# Patient Record
Sex: FEMALE | Race: WHITE | NOT HISPANIC OR LATINO | Employment: OTHER | URBAN - METROPOLITAN AREA
[De-identification: names, ages, dates, MRNs, and addresses within clinical notes are randomized per-mention and may not be internally consistent; named-entity substitution may affect disease eponyms.]

---

## 2024-01-17 PROBLEM — R06.09 EXERTIONAL DYSPNEA: Status: ACTIVE | Noted: 2024-01-17

## 2024-01-17 PROBLEM — E78.49 OTHER HYPERLIPIDEMIA: Status: ACTIVE | Noted: 2024-01-17

## 2024-01-17 PROBLEM — I35.1 MODERATE AORTIC INSUFFICIENCY: Status: ACTIVE | Noted: 2024-01-17

## 2024-01-17 PROBLEM — I25.10 CORONARY ARTERY DISEASE: Status: ACTIVE | Noted: 2024-01-17

## 2024-01-17 PROBLEM — R53.83 FATIGUE: Status: ACTIVE | Noted: 2024-01-17

## 2024-01-17 PROBLEM — E78.5 HYPERLIPIDEMIA: Status: ACTIVE | Noted: 2024-01-17

## 2024-01-17 PROBLEM — I10 BENIGN ESSENTIAL HTN: Status: ACTIVE | Noted: 2024-01-17

## 2024-01-24 ENCOUNTER — OFFICE VISIT (OUTPATIENT)
Dept: CARDIOLOGY | Facility: CLINIC | Age: 84
End: 2024-01-24
Payer: MEDICARE

## 2024-01-24 VITALS
DIASTOLIC BLOOD PRESSURE: 70 MMHG | OXYGEN SATURATION: 96 % | HEIGHT: 62 IN | WEIGHT: 105 LBS | SYSTOLIC BLOOD PRESSURE: 118 MMHG | BODY MASS INDEX: 19.32 KG/M2 | HEART RATE: 89 BPM

## 2024-01-24 DIAGNOSIS — I10 BENIGN ESSENTIAL HTN: ICD-10-CM

## 2024-01-24 DIAGNOSIS — E78.49 OTHER HYPERLIPIDEMIA: ICD-10-CM

## 2024-01-24 DIAGNOSIS — R06.09 EXERTIONAL DYSPNEA: ICD-10-CM

## 2024-01-24 DIAGNOSIS — I35.0 NONRHEUMATIC AORTIC VALVE STENOSIS: Primary | ICD-10-CM

## 2024-01-24 DIAGNOSIS — I25.10 CORONARY ARTERY DISEASE INVOLVING NATIVE CORONARY ARTERY OF NATIVE HEART WITHOUT ANGINA PECTORIS: ICD-10-CM

## 2024-01-24 DIAGNOSIS — R53.82 CHRONIC FATIGUE: ICD-10-CM

## 2024-01-24 PROBLEM — I35.1 MODERATE AORTIC INSUFFICIENCY: Status: RESOLVED | Noted: 2024-01-17 | Resolved: 2024-01-24

## 2024-01-24 PROCEDURE — 1159F MED LIST DOCD IN RCRD: CPT | Performed by: INTERNAL MEDICINE

## 2024-01-24 PROCEDURE — 1036F TOBACCO NON-USER: CPT | Performed by: INTERNAL MEDICINE

## 2024-01-24 PROCEDURE — 99214 OFFICE O/P EST MOD 30 MIN: CPT | Performed by: INTERNAL MEDICINE

## 2024-01-24 PROCEDURE — 3078F DIAST BP <80 MM HG: CPT | Performed by: INTERNAL MEDICINE

## 2024-01-24 PROCEDURE — 3074F SYST BP LT 130 MM HG: CPT | Performed by: INTERNAL MEDICINE

## 2024-01-24 PROCEDURE — 1126F AMNT PAIN NOTED NONE PRSNT: CPT | Performed by: INTERNAL MEDICINE

## 2024-01-24 RX ORDER — ELECTROLYTES/DEXTROSE
SOLUTION, ORAL ORAL
COMMUNITY
Start: 2019-11-05

## 2024-01-24 RX ORDER — CHOLECALCIFEROL (VITAMIN D3) 50 MCG
TABLET ORAL
COMMUNITY
Start: 2010-05-06

## 2024-01-24 RX ORDER — HYDROCHLOROTHIAZIDE 12.5 MG/1
12.5 TABLET ORAL DAILY
COMMUNITY
End: 2024-03-07 | Stop reason: ALTCHOICE

## 2024-01-24 RX ORDER — AMLODIPINE BESYLATE 5 MG/1
5 TABLET ORAL 2 TIMES DAILY
COMMUNITY

## 2024-01-24 RX ORDER — ROSUVASTATIN CALCIUM 40 MG/1
40 TABLET, COATED ORAL DAILY
COMMUNITY
End: 2024-04-10 | Stop reason: SDUPTHER

## 2024-01-24 RX ORDER — LOSARTAN POTASSIUM 100 MG/1
100 TABLET ORAL DAILY
COMMUNITY
End: 2024-03-07 | Stop reason: ALTCHOICE

## 2024-01-24 RX ORDER — ALENDRONATE SODIUM 70 MG/1
TABLET ORAL
COMMUNITY

## 2024-01-24 RX ORDER — PSYLLIUM HUSK/CALCIUM CARB 1 G-60 MG
CAPSULE ORAL
COMMUNITY
Start: 2019-11-05

## 2024-01-24 RX ORDER — LANOLIN ALCOHOL/MO/W.PET/CERES
CREAM (GRAM) TOPICAL
COMMUNITY

## 2024-01-24 RX ORDER — FORMOTEROL FUMARATE DIHYDRATE 20 UG/2ML
SOLUTION RESPIRATORY (INHALATION)
COMMUNITY

## 2024-01-24 RX ORDER — ATORVASTATIN CALCIUM 40 MG/1
40 TABLET, FILM COATED ORAL
COMMUNITY
End: 2024-01-24 | Stop reason: WASHOUT

## 2024-01-24 RX ORDER — LEFLUNOMIDE 10 MG/1
10 TABLET ORAL DAILY
COMMUNITY

## 2024-01-24 RX ORDER — ASCORBIC ACID 500 MG
1 TABLET ORAL DAILY
COMMUNITY
Start: 2022-05-17

## 2024-01-24 NOTE — PROGRESS NOTES
"Subjective   Raven Lujan is a 83 y.o. female.    Chief Complaint:  Follow-up coronary artery disease and hypertension.    HPI    From a cardiac standpoint she is feeling well.  She has some arthritis issues.  She did have COVID in the past but has had no symptoms of long COVID.  Has not had any typical anginal symptoms.  Mild exertional dyspnea.  She has noted highly variable blood pressures.  At her physician's office the other day her blood pressure was 198/112.  Over a period of about 10 minutes it dropped to 150/70.  Does not routinely check her blood pressures at home although she is starting to do so.  No other medical problems or medical illnesses since her last visit.    She has a positive calcium score of 816 with calcification located in the proximal left anterior descending coronary artery and is consistent with the presence of extensive plaque formation.     Other medical problems include a history of chronic obstructive lung disease or asthma, although she has never smoked. She has a history of hyperlipidemia and a strongly positive family history of heart disease. 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    Allergies to medications: None known    Family history: Family history of coronary artery disease involving multiple family members.    Social history: She is .  She is a non-smoker and has never smoked.    ROS    Visit Vitals  /70 (BP Location: Left arm, Patient Position: Sitting, BP Cuff Size: Adult)   Pulse 89   Ht 1.575 m (5' 2\")   Wt 47.6 kg (105 lb)   SpO2 96%   BMI 19.20 kg/m²   Smoking Status Former   BSA 1.44 m²        Objective     Constitutional:       Appearance: Not in distress.   Neck:      Vascular: JVD normal.   Pulmonary:      Breath sounds: Normal breath sounds.   Cardiovascular:      Normal rate. Regular rhythm. Normal S1. Normal S2.       Murmurs: There is a grade 1/6 systolic murmur.      No gallop.    Pulses:     Intact distal pulses. "   Edema:     Peripheral edema absent.   Abdominal:      General: There is no distension.      Palpations: Abdomen is soft.   Neurological:      Mental Status: Alert.       Assessment:    1.  Coronary disease.  Extensive coronary artery disease based on a prior CT calcium score.  This testing was done several years ago.  She had a nuclear stress thallium study in 2021 which showed no evidence of ischemic heart disease with the left ventricular ejection fraction of 74%.    2.  Aortic stenosis and regurgitation.  Mild by her most recent echo study.    3.  Hyperlipidemia.  Cholesterol 135, HDL 44, LDL 69.    4.  Hypertension.  Blood pressures are actually very low today.  We are going to have her track her blood pressures at home.  If she has consistently elevated blood pressures particularly in the morning we would have her do an a.m. cortisol level, catecholamine level and plasma metanephrine level.

## 2024-01-24 NOTE — PATIENT INSTRUCTIONS
If your blood pressure is consistently elevated above 150 systolic, call us and we will arrange for you to have some blood tests.

## 2024-02-12 ENCOUNTER — TELEPHONE (OUTPATIENT)
Dept: CARDIOLOGY | Facility: CLINIC | Age: 84
End: 2024-02-12
Payer: MEDICARE

## 2024-02-12 DIAGNOSIS — I10 BENIGN ESSENTIAL HTN: Primary | ICD-10-CM

## 2024-02-12 NOTE — TELEPHONE ENCOUNTER
Pt states Dr. Saenz wanted her to call the office if her blood pressure was consistently >150.    Home BP's: 150's-100's/80's-112.    Meds: Losartan 100mg in the a.m., hydrochlorothiazide 12.5mg in the a.m., Amlodipine 5mg twice a day.    Dr. Saenz was going to order labs if BP's remained elevated.    Cortisol in a.m., catecholamine level and plasma metanephrine level??    Please advise. Thanks.

## 2024-02-13 NOTE — TELEPHONE ENCOUNTER
Cortisol level catecholamines and plasma metanephrines.    Also nurse visit.  Have her bring in blood pressure machine and check with your manual blood pressure

## 2024-02-13 NOTE — TELEPHONE ENCOUNTER
Called and notified pt of plan for labs and nurse visit. Pt agreeable. Labs entered and pt informed to have done early morning because that is necessary for the cortisol level that Dr. Saenz is ordering. Pt scheduled for nurse visit, BP check on 2/22/24 and aware to bring home BP cuff to compare results. Pt verbalizes understanding of all instructions and information. All questions and concerns addressed at this time.

## 2024-02-19 ENCOUNTER — LAB (OUTPATIENT)
Dept: LAB | Facility: LAB | Age: 84
End: 2024-02-19
Payer: MEDICARE

## 2024-02-19 DIAGNOSIS — I10 BENIGN ESSENTIAL HTN: ICD-10-CM

## 2024-02-19 LAB — CORTIS AM PEAK SERPL-MSCNC: 31.1 UG/DL (ref 5–20)

## 2024-02-19 PROCEDURE — 83835 ASSAY OF METANEPHRINES: CPT

## 2024-02-19 PROCEDURE — 82533 TOTAL CORTISOL: CPT

## 2024-02-19 PROCEDURE — 36415 COLL VENOUS BLD VENIPUNCTURE: CPT

## 2024-02-19 PROCEDURE — 82384 ASSAY THREE CATECHOLAMINES: CPT

## 2024-02-22 ENCOUNTER — APPOINTMENT (OUTPATIENT)
Dept: CARDIOLOGY | Facility: CLINIC | Age: 84
End: 2024-02-22
Payer: MEDICARE

## 2024-02-22 ENCOUNTER — TELEPHONE (OUTPATIENT)
Dept: CARDIOLOGY | Facility: CLINIC | Age: 84
End: 2024-02-22

## 2024-02-22 NOTE — TELEPHONE ENCOUNTER
----- Message from Ricky Saenz MD sent at 2/20/2024  8:19 AM EST -----  Schedule Raven Lujan for OV 2 weeks

## 2024-02-23 LAB
ANNOTATION COMMENT IMP: ABNORMAL
METANEPHS SERPL-SCNC: 0.64 NMOL/L (ref 0–0.49)
NORMETANEPH FREE SERPL-SCNC: 1.82 NMOL/L (ref 0–0.89)

## 2024-02-26 LAB
DOPAMINE SERPL-MCNC: 67 PG/ML (ref 0–48)
EPINEPH PLAS-MCNC: 82 PG/ML (ref 0–62)
NOREPINEPH PLAS-MCNC: 1001 PG/ML (ref 0–874)

## 2024-02-28 ENCOUNTER — TELEPHONE (OUTPATIENT)
Dept: CARDIOLOGY | Facility: CLINIC | Age: 84
End: 2024-02-28
Payer: MEDICARE

## 2024-02-28 ENCOUNTER — LAB (OUTPATIENT)
Dept: LAB | Facility: LAB | Age: 84
End: 2024-02-28
Payer: MEDICARE

## 2024-02-28 DIAGNOSIS — R53.82 CHRONIC FATIGUE: ICD-10-CM

## 2024-02-28 PROCEDURE — 36415 COLL VENOUS BLD VENIPUNCTURE: CPT

## 2024-02-28 PROCEDURE — 82024 ASSAY OF ACTH: CPT

## 2024-02-28 NOTE — TELEPHONE ENCOUNTER
----- Message from Jody Capone RN sent at 2/27/2024 10:23 AM EST -----  Regarding: results  Per Dr. Saenz, please call pt with results.    ----- Message -----  From: Ricky Saenz MD  Sent: 2/26/2024   8:23 AM EST  To: Ange Mi RT; Alem Lima CMA; #    Needs to have ACTH done. Diagnosis Cushing's syndrome.

## 2024-03-01 LAB — ACTH PLAS-MCNC: 24.2 PG/ML (ref 7.2–63.3)

## 2024-03-04 PROBLEM — R05.9 COUGH: Status: ACTIVE | Noted: 2023-08-22

## 2024-03-04 PROBLEM — J44.1 ACUTE EXACERBATION OF CHRONIC OBSTRUCTIVE PULMONARY DISEASE (MULTI): Status: ACTIVE | Noted: 2024-03-04

## 2024-03-04 PROBLEM — M53.3 SACROILIAC JOINT PAIN: Status: ACTIVE | Noted: 2024-03-04

## 2024-03-04 PROBLEM — Z97.4 USES HEARING AID: Status: ACTIVE | Noted: 2024-03-04

## 2024-03-04 PROBLEM — M35.3 POLYMYALGIA RHEUMATICA (MULTI): Status: ACTIVE | Noted: 2024-03-04

## 2024-03-04 PROBLEM — J40 BRONCHITIS: Status: ACTIVE | Noted: 2024-03-04

## 2024-03-04 PROBLEM — M05.9 SEROPOSITIVE RHEUMATOID ARTHRITIS (MULTI): Status: ACTIVE | Noted: 2024-03-04

## 2024-03-04 PROBLEM — I10 BENIGN ESSENTIAL HTN: Status: RESOLVED | Noted: 2024-01-17 | Resolved: 2024-03-04

## 2024-03-04 PROBLEM — M19.049 LOCALIZED, PRIMARY OSTEOARTHRITIS OF HAND: Status: ACTIVE | Noted: 2024-03-04

## 2024-03-04 PROBLEM — M19.90 ARTHRITIS: Status: ACTIVE | Noted: 2024-03-04

## 2024-03-04 PROBLEM — M25.60 JOINT STIFFNESS: Status: ACTIVE | Noted: 2024-03-04

## 2024-03-04 PROBLEM — E55.9 VITAMIN D DEFICIENCY: Status: ACTIVE | Noted: 2024-03-04

## 2024-03-04 PROBLEM — K21.9 GERD (GASTROESOPHAGEAL REFLUX DISEASE): Status: ACTIVE | Noted: 2024-03-04

## 2024-03-04 PROBLEM — M47.27 LUMBOSACRAL SPONDYLOSIS WITH RADICULOPATHY: Status: ACTIVE | Noted: 2024-03-04

## 2024-03-04 PROBLEM — M54.9 BACKACHE: Status: ACTIVE | Noted: 2024-03-04

## 2024-03-07 ENCOUNTER — OFFICE VISIT (OUTPATIENT)
Dept: CARDIOLOGY | Facility: CLINIC | Age: 84
End: 2024-03-07
Payer: MEDICARE

## 2024-03-07 VITALS
HEART RATE: 93 BPM | HEIGHT: 62 IN | BODY MASS INDEX: 19.51 KG/M2 | SYSTOLIC BLOOD PRESSURE: 120 MMHG | OXYGEN SATURATION: 99 % | DIASTOLIC BLOOD PRESSURE: 80 MMHG | WEIGHT: 106 LBS

## 2024-03-07 DIAGNOSIS — I10 PRIMARY HYPERTENSION: Primary | ICD-10-CM

## 2024-03-07 PROCEDURE — 99213 OFFICE O/P EST LOW 20 MIN: CPT | Performed by: INTERNAL MEDICINE

## 2024-03-07 PROCEDURE — 3079F DIAST BP 80-89 MM HG: CPT | Performed by: INTERNAL MEDICINE

## 2024-03-07 PROCEDURE — 1036F TOBACCO NON-USER: CPT | Performed by: INTERNAL MEDICINE

## 2024-03-07 PROCEDURE — 1126F AMNT PAIN NOTED NONE PRSNT: CPT | Performed by: INTERNAL MEDICINE

## 2024-03-07 PROCEDURE — 1159F MED LIST DOCD IN RCRD: CPT | Performed by: INTERNAL MEDICINE

## 2024-03-07 PROCEDURE — 3074F SYST BP LT 130 MM HG: CPT | Performed by: INTERNAL MEDICINE

## 2024-03-07 RX ORDER — PREDNISONE 20 MG/1
TABLET ORAL
COMMUNITY
Start: 2024-03-06

## 2024-03-07 RX ORDER — LOSARTAN POTASSIUM AND HYDROCHLOROTHIAZIDE 12.5; 1 MG/1; MG/1
1 TABLET ORAL DAILY
Qty: 90 TABLET | Refills: 3 | Status: SHIPPED | OUTPATIENT
Start: 2024-03-07 | End: 2025-03-07

## 2024-03-07 RX ORDER — AZITHROMYCIN 250 MG/1
TABLET, FILM COATED ORAL
COMMUNITY
Start: 2023-07-31

## 2024-03-07 RX ORDER — BUDESONIDE 0.5 MG/2ML
INHALANT ORAL
COMMUNITY
Start: 2024-03-06

## 2024-03-07 ASSESSMENT — ENCOUNTER SYMPTOMS: DYSPNEA ON EXERTION: 1

## 2024-03-07 NOTE — PROGRESS NOTES
Subjective   Raven Lujan is a 83 y.o. female.    Chief Complaint:  Follow-up testing for uncontrolled hypertension.    HPI    Since her last visit she has felt better.  She has improved energy.  She continues to no and record variable blood pressures.  However the swings in blood pressure are much less.  To go as high as 150s systolic and as low as 100 systolic.  Overall however she feels better.  No anginal symptoms.    She has a positive calcium score of 816 with calcification located in the proximal left anterior descending coronary artery and is consistent with the presence of extensive plaque formation.     Other medical problems include a history of chronic obstructive lung disease or asthma, although she has never smoked. She has a history of hyperlipidemia and a strongly positive family history of heart disease. 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     Allergies to medications: None known     Family history: Family history of coronary artery disease involving multiple family members.     Social history: She is .  She is a non-smoker and has never smoked.    Review of Systems   Cardiovascular:  Positive for dyspnea on exertion.   Musculoskeletal:  Positive for arthritis.   All other systems reviewed and are negative.      Visit Vitals  Smoking Status Former        Objective     Constitutional:       Appearance: Not in distress.   Neck:      Vascular: JVD normal.   Pulmonary:      Breath sounds: Normal breath sounds.   Cardiovascular:      Normal rate. Regular rhythm. Normal S1. Normal S2.       Murmurs: There is a grade 1/6 systolic murmur.      No gallop.    Pulses:     Intact distal pulses.   Edema:     Peripheral edema absent.   Abdominal:      General: There is no distension.      Palpations: Abdomen is soft.   Neurological:      Mental Status: Alert.       Assessment:    1.  Paroxysmal hypertension.  Screening test for pheochromocytoma are negative.  Blood pressures  are improved.  Today's blood pressure is normal.  Will continue current medical management.    2.  Coronary artery disease.  Remains asymptomatic.  Last stress thallium study was in 2021 which was normal.    3.  Aortic stenosis.  Mild by exam and by echo studies.

## 2024-04-10 DIAGNOSIS — E78.49 OTHER HYPERLIPIDEMIA: ICD-10-CM

## 2024-04-10 RX ORDER — ROSUVASTATIN CALCIUM 40 MG/1
40 TABLET, COATED ORAL DAILY
Qty: 90 TABLET | Refills: 3 | Status: SHIPPED | OUTPATIENT
Start: 2024-04-10

## 2024-07-09 ENCOUNTER — APPOINTMENT (OUTPATIENT)
Dept: CARDIOLOGY | Facility: CLINIC | Age: 84
End: 2024-07-09
Payer: MEDICARE

## 2024-11-05 ENCOUNTER — HOSPITAL ENCOUNTER (OUTPATIENT)
Dept: CARDIOLOGY | Facility: CLINIC | Age: 84
Discharge: HOME | End: 2024-11-05
Payer: MEDICARE

## 2024-11-05 ENCOUNTER — APPOINTMENT (OUTPATIENT)
Dept: CARDIOLOGY | Facility: CLINIC | Age: 84
End: 2024-11-05
Payer: MEDICARE

## 2024-11-05 VITALS
SYSTOLIC BLOOD PRESSURE: 130 MMHG | HEART RATE: 82 BPM | OXYGEN SATURATION: 90 % | DIASTOLIC BLOOD PRESSURE: 80 MMHG | WEIGHT: 104 LBS | BODY MASS INDEX: 19.14 KG/M2 | HEIGHT: 62 IN

## 2024-11-05 VITALS — HEIGHT: 62 IN | BODY MASS INDEX: 19.39 KG/M2

## 2024-11-05 DIAGNOSIS — I25.10 CORONARY ARTERY DISEASE INVOLVING NATIVE CORONARY ARTERY OF NATIVE HEART WITHOUT ANGINA PECTORIS: ICD-10-CM

## 2024-11-05 DIAGNOSIS — I10 BENIGN ESSENTIAL HTN: Primary | ICD-10-CM

## 2024-11-05 DIAGNOSIS — E78.49 OTHER HYPERLIPIDEMIA: ICD-10-CM

## 2024-11-05 DIAGNOSIS — I35.0 NONRHEUMATIC AORTIC VALVE STENOSIS: ICD-10-CM

## 2024-11-05 DIAGNOSIS — I10 PRIMARY HYPERTENSION: ICD-10-CM

## 2024-11-05 DIAGNOSIS — R06.09 EXERTIONAL DYSPNEA: ICD-10-CM

## 2024-11-05 DIAGNOSIS — R53.82 CHRONIC FATIGUE: ICD-10-CM

## 2024-11-05 DIAGNOSIS — I10 BENIGN ESSENTIAL HTN: ICD-10-CM

## 2024-11-05 PROBLEM — J44.9 CHRONIC OBSTRUCTIVE PULMONARY DISEASE, UNSPECIFIED: Status: ACTIVE | Noted: 2024-11-05

## 2024-11-05 PROBLEM — E11.9 TYPE 2 DIABETES MELLITUS WITHOUT COMPLICATIONS (MULTI): Status: ACTIVE | Noted: 2024-11-05

## 2024-11-05 LAB
ATRIAL RATE: 71 BPM
P AXIS: 53 DEGREES
P OFFSET: 204 MS
P ONSET: 156 MS
PR INTERVAL: 136 MS
Q ONSET: 224 MS
QRS COUNT: 12 BEATS
QRS DURATION: 72 MS
QT INTERVAL: 380 MS
QTC CALCULATION(BAZETT): 412 MS
QTC FREDERICIA: 402 MS
R AXIS: 58 DEGREES
T AXIS: 59 DEGREES
T OFFSET: 414 MS
VENTRICULAR RATE: 71 BPM

## 2024-11-05 PROCEDURE — 93306 TTE W/DOPPLER COMPLETE: CPT | Performed by: INTERNAL MEDICINE

## 2024-11-05 PROCEDURE — 99214 OFFICE O/P EST MOD 30 MIN: CPT | Performed by: INTERNAL MEDICINE

## 2024-11-05 PROCEDURE — 93306 TTE W/DOPPLER COMPLETE: CPT

## 2024-11-05 PROCEDURE — 1036F TOBACCO NON-USER: CPT | Performed by: INTERNAL MEDICINE

## 2024-11-05 PROCEDURE — 3075F SYST BP GE 130 - 139MM HG: CPT | Performed by: INTERNAL MEDICINE

## 2024-11-05 PROCEDURE — 3079F DIAST BP 80-89 MM HG: CPT | Performed by: INTERNAL MEDICINE

## 2024-11-05 PROCEDURE — 93005 ELECTROCARDIOGRAM TRACING: CPT | Performed by: INTERNAL MEDICINE

## 2024-11-05 PROCEDURE — 1159F MED LIST DOCD IN RCRD: CPT | Performed by: INTERNAL MEDICINE

## 2024-11-05 RX ORDER — ALBUTEROL SULFATE 90 UG/1
INHALANT RESPIRATORY (INHALATION) EVERY 4 HOURS PRN
COMMUNITY
Start: 2024-10-24

## 2024-11-05 ASSESSMENT — ENCOUNTER SYMPTOMS
NERVOUS/ANXIOUS: 1
DYSPNEA ON EXERTION: 1

## 2024-11-05 NOTE — PROGRESS NOTES
Subjective   Raven Lujan is a 84 y.o. female.    Chief Complaint:  Follow-up coronary disease, hypertension, hyperlipidemia.    HPI    Over the past 9 months she has done well.  She has less swings in blood pressure and they have been much more stable.  No anginal symptoms.  Does have chronic shortness of breath and exertional dyspnea.  Has occasional episodes of exacerbation of her underlying chronic lung disease.  No palpitations.  No dizziness or lightheadedness.  Under a lot of stress at home because her  who is elderly and is undergoing treatment for exacerbation of his non-Hodgkin's lymphoma.    She has a positive calcium score of 816 with calcification located in the proximal left anterior descending coronary artery and is consistent with the presence of extensive plaque formation.     Other medical problems include a history of chronic obstructive lung disease or asthma, although she has never smoked. She has a history of hyperlipidemia and a strongly positive family history of heart disease. 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     Allergies to medications: None known     Family history: Family history of coronary artery disease involving multiple family members.     Social history: She is .  She is a non-smoker and has never smoked.    Review of Systems   Constitutional: Positive for malaise/fatigue.   Cardiovascular:  Positive for dyspnea on exertion.   Musculoskeletal:  Positive for arthritis.   Psychiatric/Behavioral:  The patient is nervous/anxious.    All other systems reviewed and are negative.      Current Outpatient Medications   Medication Sig Dispense Refill    albuterol 90 mcg/actuation inhaler every 4 hours if needed.      alendronate (Fosamax) 70 mg tablet use 1 tablet 30 minutes before the first food, beverage or medicine of the day with plain water Orally once a week 90 days      amLODIPine (Norvasc) 5 mg tablet Take 1 tablet (5 mg) by mouth 2 times  "a day.      ascorbic acid (Vitamin C) 500 mg tablet Take 1 tablet (500 mg) by mouth once daily.      azithromycin (Zithromax) 250 mg tablet Take by mouth.      biotin 5 mg capsule Take by mouth.      budesonide (Pulmicort) 0.5 mg/2 mL nebulizer solution       cholecalciferol (Vitamin D-3) 50 MCG (2000 UT) tablet Take by mouth.      cyanocobalamin (Vitamin B-12) 1,000 mcg tablet Take by mouth.      formoterol (Perforomist) 20 mcg/2 mL nebulizer solution As needed      losartan-hydrochlorothiazide (Hyzaar) 100-12.5 mg tablet Take 1 tablet by mouth once daily. 90 tablet 3    LUTEIN EXTRACT-ZEAXANTHIN EXT ORAL Take by mouth.      predniSONE (Deltasone) 20 mg tablet       psyllium husk-calcium (Metamucil Plus Calcium) 1-60 gram-mg capsule Take by mouth.      rosuvastatin (Crestor) 40 mg tablet Take 1 tablet (40 mg) by mouth once daily. 90 tablet 3     No current facility-administered medications for this visit.        Visit Vitals  /80 (BP Location: Left arm)   Pulse 82   Ht 1.575 m (5' 2\")   Wt 47.2 kg (104 lb)   SpO2 90%   BMI 19.02 kg/m²   Smoking Status Former   BSA 1.44 m²        Objective     Constitutional:       Appearance: Not in distress.   Neck:      Vascular: JVD normal.   Pulmonary:      Breath sounds: Normal breath sounds.   Cardiovascular:      Normal rate. Regular rhythm. Normal S1. Normal S2.       Murmurs: There is a grade 2/6 systolic murmur.      No gallop.    Pulses:     Intact distal pulses.   Edema:     Peripheral edema absent.   Abdominal:      General: There is no distension.      Palpations: Abdomen is soft.   Neurological:      Mental Status: Alert.       Assessment:    1.  Coronary artery disease.  Extensive coronary artery disease.  No anginal symptoms.  Continue medical management.    2.  Hypertension.  Blood pressures are normal on today's visit.    3.  Aortic stenosis.  Today's echocardiographic study demonstrates mild aortic stenosis with normal left ventricular function.    4.  " Frailty.  Very low weight which is a concern.  We have suggested Paxil as an antidepressant and an appetite stimulant.  Does not want to take it at this time but is willing to consider this in the future.

## 2024-11-05 NOTE — PATIENT INSTRUCTIONS
Your echocardiogram shows normal heart function.  Your aortic valve narrowing is mild and not progressing.    Consider taking paxil (paroxetine) to gain weight.

## 2024-11-05 NOTE — LETTER
November 5, 2024     No Recipients    Patient: Raven Lujan   YOB: 1940   Date of Visit: 11/5/2024       Dear Dr. Franklin Recipients:    Thank you for referring Raven Lujan to me for evaluation. Below are my notes for this consultation.  If you have questions, please do not hesitate to call me. I look forward to following your patient along with you.       Sincerely,     Ricky Saenz MD      CC: No Recipients  ______________________________________________________________________________________    Subjective  Raven Lujan is a 84 y.o. female.    Chief Complaint:  Follow-up coronary disease, hypertension, hyperlipidemia.    HPI    Over the past 9 months she has done well.  She has less swings in blood pressure and they have been much more stable.  No anginal symptoms.  Does have chronic shortness of breath and exertional dyspnea.  Has occasional episodes of exacerbation of her underlying chronic lung disease.  No palpitations.  No dizziness or lightheadedness.  Under a lot of stress at home because her  who is elderly and is undergoing treatment for exacerbation of his non-Hodgkin's lymphoma.    She has a positive calcium score of 816 with calcification located in the proximal left anterior descending coronary artery and is consistent with the presence of extensive plaque formation.     Other medical problems include a history of chronic obstructive lung disease or asthma, although she has never smoked. She has a history of hyperlipidemia and a strongly positive family history of heart disease. 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     Allergies to medications: None known     Family history: Family history of coronary artery disease involving multiple family members.     Social history: She is .  She is a non-smoker and has never smoked.    Review of Systems   Constitutional: Positive for malaise/fatigue.   Cardiovascular:  Positive for dyspnea on exertion.  "  Musculoskeletal:  Positive for arthritis.   Psychiatric/Behavioral:  The patient is nervous/anxious.    All other systems reviewed and are negative.      Current Outpatient Medications   Medication Sig Dispense Refill   • albuterol 90 mcg/actuation inhaler every 4 hours if needed.     • alendronate (Fosamax) 70 mg tablet use 1 tablet 30 minutes before the first food, beverage or medicine of the day with plain water Orally once a week 90 days     • amLODIPine (Norvasc) 5 mg tablet Take 1 tablet (5 mg) by mouth 2 times a day.     • ascorbic acid (Vitamin C) 500 mg tablet Take 1 tablet (500 mg) by mouth once daily.     • azithromycin (Zithromax) 250 mg tablet Take by mouth.     • biotin 5 mg capsule Take by mouth.     • budesonide (Pulmicort) 0.5 mg/2 mL nebulizer solution      • cholecalciferol (Vitamin D-3) 50 MCG (2000 UT) tablet Take by mouth.     • cyanocobalamin (Vitamin B-12) 1,000 mcg tablet Take by mouth.     • formoterol (Perforomist) 20 mcg/2 mL nebulizer solution As needed     • losartan-hydrochlorothiazide (Hyzaar) 100-12.5 mg tablet Take 1 tablet by mouth once daily. 90 tablet 3   • LUTEIN EXTRACT-ZEAXANTHIN EXT ORAL Take by mouth.     • predniSONE (Deltasone) 20 mg tablet      • psyllium husk-calcium (Metamucil Plus Calcium) 1-60 gram-mg capsule Take by mouth.     • rosuvastatin (Crestor) 40 mg tablet Take 1 tablet (40 mg) by mouth once daily. 90 tablet 3     No current facility-administered medications for this visit.        Visit Vitals  /80 (BP Location: Left arm)   Pulse 82   Ht 1.575 m (5' 2\")   Wt 47.2 kg (104 lb)   SpO2 90%   BMI 19.02 kg/m²   Smoking Status Former   BSA 1.44 m²        Objective    Constitutional:       Appearance: Not in distress.   Neck:      Vascular: JVD normal.   Pulmonary:      Breath sounds: Normal breath sounds.   Cardiovascular:      Normal rate. Regular rhythm. Normal S1. Normal S2.       Murmurs: There is a grade 2/6 systolic murmur.      No gallop.    Pulses:   "   Intact distal pulses.   Edema:     Peripheral edema absent.   Abdominal:      General: There is no distension.      Palpations: Abdomen is soft.   Neurological:      Mental Status: Alert.       Assessment:    1.  Coronary artery disease.  Extensive coronary artery disease.  No anginal symptoms.  Continue medical management.    2.  Hypertension.  Blood pressures are normal on today's visit.    3.  Aortic stenosis.  Today's echocardiographic study demonstrates mild aortic stenosis with normal left ventricular function.    4.  Frailty.  Very low weight which is a concern.  We have suggested Paxil as an antidepressant and an appetite stimulant.  Does not want to take it at this time but is willing to consider this in the future.

## 2024-11-06 LAB
AORTIC VALVE MEAN GRADIENT: 6 MMHG
AORTIC VALVE PEAK VELOCITY: 1.59 M/S
AV PEAK GRADIENT: 10 MMHG
AVA (PEAK VEL): 1.82 CM2
AVA (VTI): 1.87 CM2
EJECTION FRACTION APICAL 4 CHAMBER: 60.3
EJECTION FRACTION: 63 %
LEFT VENTRICLE INTERNAL DIMENSION DIASTOLE: 3.06 CM (ref 3.5–6)
LEFT VENTRICULAR OUTFLOW TRACT DIAMETER: 1.97 CM
MITRAL VALVE E/A RATIO: 0.87
RIGHT VENTRICLE FREE WALL PEAK S': 13 CM/S
RIGHT VENTRICLE PEAK SYSTOLIC PRESSURE: 34.2 MMHG
TRICUSPID ANNULAR PLANE SYSTOLIC EXCURSION: 2.1 CM

## 2025-02-05 ENCOUNTER — OFFICE VISIT (OUTPATIENT)
Dept: URGENT CARE | Age: 85
End: 2025-02-05
Payer: MEDICARE

## 2025-02-05 VITALS
SYSTOLIC BLOOD PRESSURE: 140 MMHG | RESPIRATION RATE: 16 BRPM | WEIGHT: 105 LBS | TEMPERATURE: 97.7 F | OXYGEN SATURATION: 91 % | DIASTOLIC BLOOD PRESSURE: 80 MMHG | BODY MASS INDEX: 19.2 KG/M2 | HEART RATE: 95 BPM

## 2025-02-05 DIAGNOSIS — J06.9 UPPER RESPIRATORY TRACT INFECTION, UNSPECIFIED TYPE: ICD-10-CM

## 2025-02-05 DIAGNOSIS — Z20.822 CONTACT WITH AND (SUSPECTED) EXPOSURE TO COVID-19: ICD-10-CM

## 2025-02-05 DIAGNOSIS — J44.1 CHRONIC OBSTRUCTIVE PULMONARY DISEASE WITH ACUTE EXACERBATION (MULTI): Primary | ICD-10-CM

## 2025-02-05 LAB
POC BINAX EXPIRATION: NORMAL
POC BINAX NOW COVID SERIAL NUMBER: NORMAL
POC RAPID INFLUENZA A: NEGATIVE
POC RAPID INFLUENZA B: NEGATIVE
POC SARS-COV-2 AG BINAX: NORMAL

## 2025-02-05 RX ORDER — AMOXICILLIN AND CLAVULANATE POTASSIUM 875; 125 MG/1; MG/1
875 TABLET, FILM COATED ORAL 2 TIMES DAILY
Qty: 10 TABLET | Refills: 0 | Status: SHIPPED | OUTPATIENT
Start: 2025-02-05 | End: 2025-02-06

## 2025-02-05 ASSESSMENT — ENCOUNTER SYMPTOMS
CARDIOVASCULAR NEGATIVE: 1
GASTROINTESTINAL NEGATIVE: 1
DIZZINESS: 0
VOMITING: 0
COUGH: 1
WEAKNESS: 0
LIGHT-HEADEDNESS: 0
NEUROLOGICAL NEGATIVE: 1
EYES NEGATIVE: 1
DIAPHORESIS: 0
SHORTNESS OF BREATH: 1
PSYCHIATRIC NEGATIVE: 1
PALPITATIONS: 0
FATIGUE: 0
NAUSEA: 0
CONSTITUTIONAL NEGATIVE: 1
HEADACHES: 0
FEVER: 0
FACIAL ASYMMETRY: 0
WHEEZING: 0
MUSCULOSKELETAL NEGATIVE: 1
NUMBNESS: 0

## 2025-02-06 RX ORDER — AMOXICILLIN AND CLAVULANATE POTASSIUM 875; 125 MG/1; MG/1
875 TABLET, FILM COATED ORAL 2 TIMES DAILY
Qty: 10 TABLET | Refills: 0 | Status: SHIPPED | OUTPATIENT
Start: 2025-02-06 | End: 2025-02-11

## 2025-02-24 DIAGNOSIS — I10 PRIMARY HYPERTENSION: ICD-10-CM

## 2025-02-24 RX ORDER — LOSARTAN POTASSIUM AND HYDROCHLOROTHIAZIDE 12.5; 1 MG/1; MG/1
1 TABLET ORAL DAILY
Qty: 90 TABLET | Refills: 3 | Status: SHIPPED | OUTPATIENT
Start: 2025-02-24 | End: 2026-02-24

## 2025-05-05 PROBLEM — M81.0 OSTEOPOROSIS: Status: ACTIVE | Noted: 2025-05-05

## 2025-05-20 ENCOUNTER — APPOINTMENT (OUTPATIENT)
Dept: CARDIOLOGY | Facility: CLINIC | Age: 85
End: 2025-05-20
Payer: MEDICARE

## 2025-05-20 VITALS
SYSTOLIC BLOOD PRESSURE: 130 MMHG | WEIGHT: 101 LBS | BODY MASS INDEX: 18.47 KG/M2 | OXYGEN SATURATION: 99 % | HEART RATE: 95 BPM | DIASTOLIC BLOOD PRESSURE: 80 MMHG

## 2025-05-20 DIAGNOSIS — I10 PRIMARY HYPERTENSION: ICD-10-CM

## 2025-05-20 DIAGNOSIS — E78.49 OTHER HYPERLIPIDEMIA: ICD-10-CM

## 2025-05-20 DIAGNOSIS — R06.09 EXERTIONAL DYSPNEA: ICD-10-CM

## 2025-05-20 DIAGNOSIS — I25.10 CORONARY ARTERY DISEASE INVOLVING NATIVE CORONARY ARTERY OF NATIVE HEART WITHOUT ANGINA PECTORIS: ICD-10-CM

## 2025-05-20 DIAGNOSIS — I35.0 NONRHEUMATIC AORTIC VALVE STENOSIS: Primary | ICD-10-CM

## 2025-05-20 PROCEDURE — 1036F TOBACCO NON-USER: CPT | Performed by: INTERNAL MEDICINE

## 2025-05-20 PROCEDURE — 99213 OFFICE O/P EST LOW 20 MIN: CPT | Performed by: INTERNAL MEDICINE

## 2025-05-20 PROCEDURE — 1159F MED LIST DOCD IN RCRD: CPT | Performed by: INTERNAL MEDICINE

## 2025-05-20 PROCEDURE — 3075F SYST BP GE 130 - 139MM HG: CPT | Performed by: INTERNAL MEDICINE

## 2025-05-20 PROCEDURE — 3079F DIAST BP 80-89 MM HG: CPT | Performed by: INTERNAL MEDICINE

## 2025-05-20 RX ORDER — ROSUVASTATIN CALCIUM 40 MG/1
40 TABLET, COATED ORAL DAILY
Qty: 90 TABLET | Refills: 3 | Status: SHIPPED | OUTPATIENT
Start: 2025-05-20

## 2025-05-20 NOTE — PROGRESS NOTES
Subjective   Raven Lujan is a 85 y.o. female.    Chief Complaint:  Follow-up coronary disease, hypertension, hyperlipidemia.    HPI    She had emergency room visit in February when she presented with shortness of breath.  Ultimately hospitalized for about 4 days for pneumonia.  Followed by the pulmonary service for her chronic lung disease.  Has chronic exertional dyspnea.  Blood pressures have been more stable.    She has a positive calcium score of 816 with calcification located in the proximal left anterior descending coronary artery and is consistent with the presence of extensive plaque formation.     Other medical problems include a history of chronic obstructive lung disease or asthma, although she has never smoked. She has a history of hyperlipidemia and a strongly positive family history of heart disease. 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     Allergies to medications: None known     Family history: Family history of coronary artery disease involving multiple family members.     Social history: She is .  She is a non-smoker and has never smoked.     Review of Systems   Constitutional: Positive for malaise/fatigue.   Cardiovascular:  Positive for dyspnea on exertion.   Musculoskeletal:  Positive for arthritis.   Psychiatric/Behavioral:  The patient is nervous/anxious.    All other systems reviewed and are negative.      Current Medications[1]     Visit Vitals  /80 (BP Location: Left arm, Patient Position: Sitting, BP Cuff Size: Adult)   Pulse 95   Wt 45.8 kg (101 lb)   LMP  (LMP Unknown)   SpO2 99%   BMI 18.47 kg/m²   OB Status Postmenopausal   Smoking Status Former   BSA 1.42 m²        Objective     Constitutional:       Appearance: Not in distress.   Neck:      Vascular: JVD normal.   Pulmonary:      Breath sounds: Normal breath sounds.   Cardiovascular:      Normal rate. Regular rhythm. Normal S1. Normal S2.       Murmurs: There is a grade 2/6 systolic murmur.       No gallop.    Pulses:     Decreased pulses.   Edema:     Peripheral edema absent.   Abdominal:      General: There is no distension.      Palpations: Abdomen is soft.   Neurological:      Mental Status: Alert.           Assessment:    1.  Coronary disease.  No typical anginal symptoms.  Activity levels are somewhat limited.  Exertional dyspnea.  Not on aspirin therapy because of a history of hemoptysis.    2.  Aortic stenosis.  Mild by exam and by echo studies.    3.  Hypertension.  Blood pressures are adequately controlled.    4.  Frailty.  This is a major concern.  Patient has difficulty gaining weight.    5.  COPD.  Followed by the pulmonary service         [1]   Current Outpatient Medications   Medication Sig Dispense Refill    albuterol 90 mcg/actuation inhaler every 4 hours if needed.      alendronate (Fosamax) 70 mg tablet use 1 tablet 30 minutes before the first food, beverage or medicine of the day with plain water Orally once a week 90 days      amLODIPine (Norvasc) 5 mg tablet Take 1 tablet (5 mg) by mouth 2 times a day.      ascorbic acid (Vitamin C) 500 mg tablet Take 1 tablet (500 mg) by mouth once daily.      azithromycin (Zithromax) 250 mg tablet Take by mouth.      biotin 5 mg capsule Take by mouth.      budesonide (Pulmicort) 0.5 mg/2 mL nebulizer solution       cholecalciferol (Vitamin D-3) 50 MCG (2000 UT) tablet Take by mouth.      cyanocobalamin (Vitamin B-12) 1,000 mcg tablet Take by mouth.      formoterol (Perforomist) 20 mcg/2 mL nebulizer solution As needed      losartan-hydrochlorothiazide (Hyzaar) 100-12.5 mg tablet Take 1 tablet by mouth once daily. 90 tablet 3    LUTEIN EXTRACT-ZEAXANTHIN EXT ORAL Take by mouth.      psyllium husk-calcium (Metamucil Plus Calcium) 1-60 gram-mg capsule Take by mouth.      rosuvastatin (Crestor) 40 mg tablet Take 1 tablet (40 mg) by mouth once daily. 90 tablet 3     No current facility-administered medications for this visit.